# Patient Record
Sex: FEMALE | URBAN - METROPOLITAN AREA
[De-identification: names, ages, dates, MRNs, and addresses within clinical notes are randomized per-mention and may not be internally consistent; named-entity substitution may affect disease eponyms.]

---

## 2019-10-07 ENCOUNTER — OFFICE VISIT (OUTPATIENT)
Dept: URBAN - METROPOLITAN AREA CLINIC 32 | Facility: CLINIC | Age: 24
End: 2019-10-07
Payer: COMMERCIAL

## 2019-10-07 DIAGNOSIS — S05.00XA CORNEAL ABRASION WITHOUT FB OF EYE, INITIAL ENCOUNTER: Primary | ICD-10-CM

## 2019-10-07 PROCEDURE — 92002 INTRM OPH EXAM NEW PATIENT: CPT | Performed by: OPHTHALMOLOGY

## 2019-10-07 ASSESSMENT — INTRAOCULAR PRESSURE
OD: 11
OS: 11

## 2019-10-07 NOTE — IMPRESSION/PLAN
Impression: Corneal abrasion without FB of eye, initial encounter: S05.00xA. Plan: Scratched OD when taking out CL -- small epi defect no infection -- keep CLs out - continue erythromycin ointment 6x per day -- she is minimally symptomatic Recheck 3 days